# Patient Record
Sex: MALE | Race: WHITE | NOT HISPANIC OR LATINO | Employment: FULL TIME | URBAN - NONMETROPOLITAN AREA
[De-identification: names, ages, dates, MRNs, and addresses within clinical notes are randomized per-mention and may not be internally consistent; named-entity substitution may affect disease eponyms.]

---

## 2019-11-13 ENCOUNTER — CONSULT (OUTPATIENT)
Dept: CARDIOLOGY | Facility: CLINIC | Age: 49
End: 2019-11-13

## 2019-11-13 VITALS
DIASTOLIC BLOOD PRESSURE: 86 MMHG | BODY MASS INDEX: 31.64 KG/M2 | HEIGHT: 71 IN | HEART RATE: 85 BPM | SYSTOLIC BLOOD PRESSURE: 122 MMHG | WEIGHT: 226 LBS

## 2019-11-13 DIAGNOSIS — R07.89 NON-CARDIAC CHEST PAIN: ICD-10-CM

## 2019-11-13 DIAGNOSIS — R60.9 DEPENDENT EDEMA: ICD-10-CM

## 2019-11-13 DIAGNOSIS — Z82.49 FAMILY HISTORY OF PREMATURE CAD: Primary | ICD-10-CM

## 2019-11-13 PROBLEM — E86.0 DEHYDRATION: Status: ACTIVE | Noted: 2019-11-13

## 2019-11-13 PROBLEM — F41.9 ANXIETY: Status: ACTIVE | Noted: 2019-11-13

## 2019-11-13 PROCEDURE — 93000 ELECTROCARDIOGRAM COMPLETE: CPT | Performed by: INTERNAL MEDICINE

## 2019-11-13 PROCEDURE — 99243 OFF/OP CNSLTJ NEW/EST LOW 30: CPT | Performed by: INTERNAL MEDICINE

## 2019-11-13 RX ORDER — TAMSULOSIN HYDROCHLORIDE 0.4 MG/1
1 CAPSULE ORAL DAILY
COMMUNITY

## 2019-11-13 RX ORDER — PAROXETINE 30 MG/1
15 TABLET, FILM COATED ORAL EVERY MORNING
COMMUNITY

## 2019-11-13 NOTE — ASSESSMENT & PLAN NOTE
· Patient's symptoms of rest pain are not cardiac in nature  · The patient's lack of chest pain symptoms with exertion is reassuring  · In lieu of stress testing (which I believe should be normal), I recommend coronary calcium score test

## 2019-11-13 NOTE — PROGRESS NOTES
[facility]  Cardiology Consultation Note     Brian Harper  1970  Requesting Provider: HENNY Holm  PCP: Jv Cochran MD    ID:  Brian Harper is a 49 y.o. male seen for a consultation visit regarding the following:     Chief Complaint   Patient presents with   • Chest Pain   • Dizziness   • Shortness of Breath   • Edema             The patient is a 49 y.o. male who is referred to my office by Sonia Dobbs to establish care and discuss several symptoms bothersome to him.  The symptoms have become a source of anxiety for the patient as he recently lost a friend to a heart attack.  The patient reports severe cramping in his legs at night.  This has been a chronic problem which has worsened recently.  The patient recently underwent laboratory assessment which showed normal creatinine and normal potassium levels.  The patient does not report leg pain when he is walking or exercising, however.  The patient also complains of episodes of nonexertional chest pain symptoms which occur and resolve spontaneously.  He reports no such symptoms with physical activities.  He has not taken nitroglycerin for this discomfort.  The patient reports dizziness upon standing and chronic headaches.  He saw a neurologist recently and underwent MRI imaging of his brain which he reports was normal.  The patient admits anxiety issues and is presently under a lot of stress.  He would like to make sure that he is taking precautions regarding his cardiovascular health as his mother manifested coronary artery disease in her mid 40s.      Past Medical History, Past Surgical History, Family history, Social History, and Medications were all reviewed with the patient today and updated as necessary.       Current Outpatient Medications:   •  PARoxetine (PAXIL) 30 MG tablet, Take 15 mg by mouth Every Morning., Disp: , Rfl:   •  tamsulosin (FLOMAX) 0.4 MG capsule 24 hr capsule, Take 1  "capsule by mouth Daily., Disp: , Rfl:     Allergies   Allergen Reactions   • Levofloxacin Hives and Shortness Of Breath         Past Medical History:   Diagnosis Date   • Acid reflux    • Anxiety      Past Surgical History:   Procedure Laterality Date   • COLONOSCOPY     • TOOTH EXTRACTION       Family History   Problem Relation Age of Onset   • Hyperlipidemia Mother    • Coronary artery disease Mother 49   • Heart failure Father    • Heart disease Father    • Heart attack Father 69     Social History     Tobacco Use   • Smoking status: Former Smoker     Types: Cigarettes     Last attempt to quit: 10/2009     Years since quitting: 10.1   • Smokeless tobacco: Never Used   Substance Use Topics   • Alcohol use: No     Frequency: Never       Review of Systems   Constitution: Positive for weakness and malaise/fatigue.   Eyes: Negative for vision loss in left eye and vision loss in right eye.   Cardiovascular: Positive for chest pain, dyspnea on exertion and leg swelling. Negative for near-syncope, orthopnea, palpitations, paroxysmal nocturnal dyspnea and syncope.   Respiratory: Positive for cough, shortness of breath and wheezing.    Musculoskeletal: Positive for back pain, muscle cramps, muscle weakness and myalgias.   Gastrointestinal: Positive for heartburn and nausea.   Genitourinary: Positive for dysuria.   Neurological: Positive for disturbances in coordination, dizziness and headaches. Negative for brief paralysis, excessive daytime sleepiness, focal weakness, numbness and paresthesias.   Psychiatric/Behavioral: Positive for memory loss. The patient is nervous/anxious.    All other systems reviewed and are negative.              /86 (BP Location: Right arm, Patient Position: Sitting)   Pulse 85   Ht 180.3 cm (71\")   Wt 103 kg (226 lb)   BMI 31.52 kg/m²        Physical Exam   Constitutional: He is oriented to person, place, and time. He appears well-developed and well-nourished.   HENT:   Head: " Normocephalic and atraumatic.   Eyes: Pupils are equal, round, and reactive to light. No scleral icterus.   Neck: No JVD present. Carotid bruit is not present. No thyromegaly present.   Cardiovascular: Normal rate and regular rhythm. Exam reveals no gallop.   No murmur heard.  Pulmonary/Chest: Effort normal and breath sounds normal.   Abdominal: Soft. He exhibits no distension. There is no hepatosplenomegaly.   Musculoskeletal: He exhibits no edema.   Neurological: He is alert and oriented to person, place, and time.   Skin: Skin is warm and dry.   Psychiatric: He has a normal mood and affect. His behavior is normal.       DATA:    FLP (10/30/2019):   · , Tri 201, HDL 52, LDL 84  · Na 140, K 4.4, BUN 10, Cr 1.1  · LFT wnl        ECG 12 Lead  Date/Time: 11/13/2019 3:20 PM  Performed by: Aristides Heard IV, MD  Authorized by: Aristides Heard IV, MD   Previous ECG: no previous ECG available  Rhythm: sinus rhythm  BPM: 85  Other findings: non-specific ST-T wave changes    Clinical impression: non-specific ECG                 Problem List Items Addressed This Visit     Non-cardiac chest pain    Overview     · Essentially normal EKG, 11/13/2019         Current Assessment & Plan     · Patient's symptoms of rest pain are not cardiac in nature  · The patient's lack of chest pain symptoms with exertion is reassuring  · In lieu of stress testing (which I believe should be normal), I recommend coronary calcium score test         Relevant Orders    ECG 12 Lead    Family history of premature CAD - Primary    Current Assessment & Plan     · Estimated 10-year risk of cardiovascular events is presently <7.5%.  Based on present ACC/AHA guidelines, statin therapy is not recommended for primary prevention in this patient population.  · However, patient does have a strong family history of precocious coronary artery disease involving his mother and the patient has some anxiety regarding risk of ACS due to the  recent passing of a friend from myocardial infarction  · Coronary artery calcium score testing recommended.  If CAC score >100, statin therapy for primary prevention would be reasonable         Relevant Orders    CT Cardiac Calcium Score Without Dye    Dependent edema                 · Coronary artery calcium score testing for advanced risk stratification given family history.  If CAC score >100, statin therapy will be recommended.  If 0, reassurance can be given to the patient of a very low risk of cardiovascular events over the next 5 years.  · Increase water intake to 3.7 L daily  Return in about 12 months (around 11/13/2020) for Follow-up with [facility] only.          BHAVESH Heard MD [facility], [facility]  Interventional and General Cardiology

## 2019-11-13 NOTE — ASSESSMENT & PLAN NOTE
· Estimated 10-year risk of cardiovascular events is presently <7.5%.  Based on present ACC/AHA guidelines, statin therapy is not recommended for primary prevention in this patient population.  · However, patient does have a strong family history of precocious coronary artery disease involving his mother and the patient has some anxiety regarding risk of ACS due to the recent passing of a friend from myocardial infarction  · Coronary artery calcium score testing recommended.  If CAC score >100, statin therapy for primary prevention would be reasonable

## 2019-11-13 NOTE — ASSESSMENT & PLAN NOTE
· Patient's constellation of symptoms including orthostatic dizziness, headaches, and charley horses suggest the possibility of chronic dehydration  · The patient does not drink water and reports concentrated urine further supporting this impression  · Recommend discontinuation of diet Mountain Dew   · Encourage 3.7 L daily intake of water

## 2021-03-12 ENCOUNTER — OFFICE VISIT (OUTPATIENT)
Dept: CARDIOLOGY | Facility: CLINIC | Age: 51
End: 2021-03-12

## 2021-03-12 VITALS
DIASTOLIC BLOOD PRESSURE: 78 MMHG | WEIGHT: 225.6 LBS | BODY MASS INDEX: 31.58 KG/M2 | OXYGEN SATURATION: 96 % | HEIGHT: 71 IN | SYSTOLIC BLOOD PRESSURE: 122 MMHG | HEART RATE: 75 BPM

## 2021-03-12 DIAGNOSIS — Z82.49 FAMILY HISTORY OF PREMATURE CAD: ICD-10-CM

## 2021-03-12 DIAGNOSIS — R07.89 NON-CARDIAC CHEST PAIN: Primary | ICD-10-CM

## 2021-03-12 PROBLEM — E86.0 DEHYDRATION: Status: RESOLVED | Noted: 2019-11-13 | Resolved: 2021-03-12

## 2021-03-12 PROCEDURE — 99213 OFFICE O/P EST LOW 20 MIN: CPT | Performed by: INTERNAL MEDICINE

## 2021-03-12 RX ORDER — FLUTICASONE PROPIONATE 50 MCG
2 SPRAY, SUSPENSION (ML) NASAL DAILY
COMMUNITY

## 2021-03-12 RX ORDER — MONTELUKAST SODIUM 10 MG/1
10 TABLET ORAL NIGHTLY
COMMUNITY

## 2021-03-12 RX ORDER — LORATADINE 10 MG/1
CAPSULE, LIQUID FILLED ORAL DAILY
COMMUNITY

## 2021-03-12 RX ORDER — NAPROXEN SODIUM 220 MG
220 TABLET ORAL AS NEEDED
COMMUNITY

## 2021-03-12 NOTE — PROGRESS NOTES
"[facility]      Identification: Brian Harper is a 50 y.o. male who resides in [place], [place].    Reason for visit:  Shortness of Breath, Edema, and Family Hx of Premature CAD      Subjective      Brian Harper presents to [facility] for followup.    Brian returns the office today in follow-up of his atypical chest pain and family history of coronary disease.  He has been doing well.  He has very seldom twinges of chest pain.  At last visit, I recommended he undergo coronary calcium score testing which she did not do.  He reminds me that his mother had CAD before the age of 65 and his dad  of a MI in his 70s.        Review of Systems   All other systems reviewed and are negative.      Objective     /78 (BP Location: Left arm, Patient Position: Sitting)   Pulse 75   Ht 180.3 cm (71\")   Wt 102 kg (225 lb 9.6 oz)   SpO2 96%   BMI 31.46 kg/m²       Constitutional:       Appearance: Well-developed.   Eyes:      General: No scleral icterus.     Pupils: Pupils are equal, round, and reactive to light.   HENT:      Head: Normocephalic and atraumatic.   Neck:      Thyroid: No thyromegaly.      Vascular: No carotid bruit or JVD.   Pulmonary:      Effort: Pulmonary effort is normal.      Breath sounds: Normal breath sounds.   Cardiovascular:      Normal rate. Regular rhythm.      Murmurs: There is no murmur.      No gallop.   Abdominal:      Palpations: There is no abdominal mass.      Tenderness: There is no abdominal tenderness.   Musculoskeletal:      Extremities: No clubbing present.Skin:     General: Skin is warm and dry. There is no cyanosis.   Neurological:      Mental Status: Alert and oriented to person, place, and time.   Psychiatric:         Behavior: Behavior normal.         Result Review :    No recent laboratory studies available for review today.                   Problem List Items Addressed This Visit     Non-cardiac chest pain - Primary    Overview     " · Essentially normal EKG, 11/13/2019         Current Assessment & Plan     · Minimal chest pain         Family history of premature CAD (Chronic)    Overview     · Dad MI at 69  · Mom with CAD < 65 years old         Current Assessment & Plan     · Obtain coronary calcium score         Relevant Orders    CT Cardiac Calcium Score Without Dye               · Schedule coronary calcium score  · Obtain recent lab work from [facility]    Follow-up   Return in about 1 year (around 3/12/2022).        Sean Heard MD, FACC, FSCAI  3/12/2021

## 2021-03-23 ENCOUNTER — BULK ORDERING (OUTPATIENT)
Dept: CASE MANAGEMENT | Facility: OTHER | Age: 51
End: 2021-03-23

## 2021-03-23 DIAGNOSIS — Z23 IMMUNIZATION DUE: ICD-10-CM

## 2021-04-15 ENCOUNTER — APPOINTMENT (OUTPATIENT)
Dept: CT IMAGING | Facility: HOSPITAL | Age: 51
End: 2021-04-15

## 2022-12-16 ENCOUNTER — OFFICE VISIT (OUTPATIENT)
Dept: CARDIOLOGY | Facility: CLINIC | Age: 52
End: 2022-12-16

## 2022-12-16 VITALS
HEIGHT: 71 IN | BODY MASS INDEX: 31.89 KG/M2 | WEIGHT: 227.8 LBS | DIASTOLIC BLOOD PRESSURE: 84 MMHG | HEART RATE: 72 BPM | SYSTOLIC BLOOD PRESSURE: 119 MMHG

## 2022-12-16 DIAGNOSIS — G47.33 OBSTRUCTIVE SLEEP APNEA: ICD-10-CM

## 2022-12-16 DIAGNOSIS — R07.2 PRECORDIAL PAIN: Primary | ICD-10-CM

## 2022-12-16 DIAGNOSIS — F41.9 ANXIETY: ICD-10-CM

## 2022-12-16 DIAGNOSIS — Z82.49 FAMILY HISTORY OF PREMATURE CAD: Chronic | ICD-10-CM

## 2022-12-16 PROBLEM — R07.89 NON-CARDIAC CHEST PAIN: Status: RESOLVED | Noted: 2019-11-13 | Resolved: 2022-12-16

## 2022-12-16 PROCEDURE — 93000 ELECTROCARDIOGRAM COMPLETE: CPT | Performed by: NURSE PRACTITIONER

## 2022-12-16 PROCEDURE — 99214 OFFICE O/P EST MOD 30 MIN: CPT | Performed by: NURSE PRACTITIONER

## 2022-12-16 NOTE — PROGRESS NOTES
[facility]             HENNY Barnes Rajiv M, MD  Brian Harper  2022    Patient Active Problem List   Diagnosis   • Dependent edema   • Family history of premature CAD   • Anxiety   • Obstructive sleep apnea       Dear Jv Cochran MD:    Subjective     Chief Complaint   Patient presents with   • Establish Care     NEW TO OFFICE-FORMER  PATIENT   • Chest Pain     TIGHTNESS WITH NAUSEA -PRETTY FREQUENT LAST 3 MONTH   • Palpitations     RACING WITH ACTIVITY       HPI:     This is a 52 y.o. male with known past medical history of obstructive sleep apnea and anxiety.    Brian Harper presents today for routine cardiology follow up.  Patient was seen by Dr. Quiros at [facility] in  for chest pain.  He states since that time he has been having intermittent chest pressure in the middle of his chest with radiation to his left arm with associated numbness and tingling of his left arm.  The chest pain comes at random.  Denies any alleviating or aggravating factors.  He does get short of breath and dizzy sometimes with the chest pressure.  He does report having a stress test in the past however this has been around 10 years ago.  No history of diabetes, tobacco abuse or hypertension.  Reports family history of premature coronary artery disease in his mother who had multiple stents in her 40s.  His father had an MI and  at 69 years of age.  He does endorse working jobs that are high in stress and sometimes feels this might be causing his chest pain however he feels that it has worsened recently.  Recent lab work showed stable CMP, CBC and TSH.  Was diagnosed with sleep apnea however has not been wearing his CPAP.    • Diagnostic Testing  1. None       All other systems were reviewed and were negative.    Patient Active Problem List   Diagnosis   • Dependent edema   • Family history of  premature CAD   • Anxiety   • Obstructive sleep apnea       family history includes Coronary artery disease (age of onset: 49) in his mother; Heart attack (age of onset: 69) in his father; Heart disease in his father; Heart failure in his father; Hyperlipidemia in his mother.     reports that he quit smoking about 13 years ago. His smoking use included cigarettes. He has never used smokeless tobacco. He reports that he does not drink alcohol and does not use drugs.    Allergies   Allergen Reactions   • Levofloxacin Hives and Shortness Of Breath         Current Outpatient Medications:   •  fluticasone (FLONASE) 50 MCG/ACT nasal spray, 2 sprays into the nostril(s) as directed by provider Daily., Disp: , Rfl:   •  Loratadine 10 MG capsule, Take  by mouth Daily., Disp: , Rfl:   •  montelukast (SINGULAIR) 10 MG tablet, Take 10 mg by mouth Every Night., Disp: , Rfl:   •  naproxen sodium (ALEVE) 220 MG tablet, Take 220 mg by mouth As Needed., Disp: , Rfl:   •  PARoxetine (PAXIL) 30 MG tablet, Take 15 mg by mouth Every Morning., Disp: , Rfl:   •  tamsulosin (FLOMAX) 0.4 MG capsule 24 hr capsule, Take 1 capsule by mouth Daily., Disp: , Rfl:       Physical Exam:  I have reviewed the patient's current vital signs as documented in the patient's EMR.   Vitals:    12/16/22 0813   BP: 119/84   Pulse: 72     Body mass index is 31.77 kg/m².       12/16/22 0813   Weight: 103 kg (227 lb 12.8 oz)      Physical Exam  Constitutional:       General: He is not in acute distress.     Appearance: Normal appearance. He is well-developed.   HENT:      Head: Normocephalic and atraumatic.      Mouth/Throat:      Mouth: Mucous membranes are moist.   Eyes:      Extraocular Movements: Extraocular movements intact.      Pupils: Pupils are equal, round, and reactive to light.   Neck:      Vascular: No JVD.   Cardiovascular:      Rate and Rhythm: Normal rate and regular rhythm.      Heart sounds: Normal heart sounds. No murmur heard.    No S3 or S4  sounds.   Pulmonary:      Effort: Pulmonary effort is normal. No respiratory distress.      Breath sounds: Normal breath sounds. No wheezing.   Abdominal:      General: Bowel sounds are normal. There is no distension.      Palpations: Abdomen is soft. There is no hepatomegaly.      Tenderness: There is no abdominal tenderness.   Musculoskeletal:         General: Normal range of motion.      Cervical back: Normal range of motion and neck supple.   Skin:     General: Skin is warm and dry.      Coloration: Skin is not jaundiced or pale.   Neurological:      General: No focal deficit present.      Mental Status: He is alert and oriented to person, place, and time. Mental status is at baseline.   Psychiatric:         Mood and Affect: Mood normal.         Behavior: Behavior normal.         Thought Content: Thought content normal.         Judgment: Judgment normal.            DATA REVIEWED:     TTE/ANNY:      Laboratory evaluations:    No results found for: GLUCOSE, BUN, CREATININE, EGFRIFNONA, EGFRIFAFRI, BCR, K, CO2, CALCIUM, PROTENTOTREF, ALBUMIN, LABIL2, BILIRUBIN, AST, ALT  No results found for: WBC, HGB, HCT, MCV, PLT  No results found for: CHOL, CHLPL, TRIG, HDL, LDL, LDLDIRECT  No results found for: TSH, E7VLGLL, Q9WLXBD, THYROIDAB  No results found for: HGBA1C  No results found for: ALT  No results found for: HGBA1C  No results found for: GLUF, MICROALBUR, CREATININE  No results found for: IRON, TIBC, FERRITIN  No results found for: INR, PROTIME          ECG 12 Lead    Date/Time: 12/16/2022 11:03 AM  Performed by: Claribel Lechuga APRN  Authorized by: Claribel Lechuga APRN   Comparison: compared with previous ECG   Rhythm: sinus rhythm  Rate: normal  Conduction: incomplete right bundle branch block    Clinical impression: non-specific ECG          --------------------------------------------------------------------------------------------------------------------------    ASSESSMENT/PLAN:       Diagnosis Plan   1. Precordial pain  Stress Test With Pet Myocardial Perfusion    Adult Transthoracic Echo Complete w/ Color, Spectral and Contrast if necessary per protocol    Lipid Panel    Hemoglobin A1c      2. Family history of premature CAD        3. Obstructive sleep apnea        4. Anxiety            1. Chest pain  2. Family history of premature coronary artery disease  • Given patient's persistent intermittent chest pressure that is suspicious for ischemia we will evaluate further with nuclear stress test and echocardiogram.  • Obtain lipid panel and hemoglobin A1c to screen for hyperlipidemia and diabetes.  • Did discuss adding oral nitrates for chest pain however he would like to hold off on this for now.  • Reviewed recent labs which showed normal TSH, CMP and CBC    3. Obstructive sleep apnea  • Patient reports he was diagnosed with sleep apnea however does not wear his CPAP.  Advised compliance with this.  He reports he will start wearing it.    4.  Anxiety  · Discussed with patient about talking with his PCP regarding his anxiety for possible further medication management.      This document has been @Electronically signed by HENNY Barnes, 12/16/22, 8:35 AM EST.       Dictated Utilizing Dragon Dictation: Part of this note may be an electronic transcription/translation of spoken language to printed text using the Dragon Dictation System.    Follow-up appointment and medication changes provided in hand delivered After Visit Summary as well as reviewed in the room.

## 2022-12-22 DIAGNOSIS — Z82.49 FAMILY HISTORY OF PREMATURE CAD: Primary | Chronic | ICD-10-CM

## 2022-12-22 DIAGNOSIS — R07.2 PRECORDIAL PAIN: ICD-10-CM
